# Patient Record
Sex: MALE | Race: WHITE | NOT HISPANIC OR LATINO | Employment: OTHER | URBAN - METROPOLITAN AREA
[De-identification: names, ages, dates, MRNs, and addresses within clinical notes are randomized per-mention and may not be internally consistent; named-entity substitution may affect disease eponyms.]

---

## 2017-01-12 ENCOUNTER — TELEPHONE (OUTPATIENT)
Dept: HEMATOLOGY/ONCOLOGY | Facility: CLINIC | Age: 77
End: 2017-01-12

## 2017-01-12 NOTE — TELEPHONE ENCOUNTER
----- Message from Kole Clark sent at 1/12/2017  3:13 PM CST -----  Contact: Chelly with [facility]   Chelly with [facility] is calling to get orders signed to admit pt for hospice.  Contact number [phone]

## 2017-01-18 ENCOUNTER — TELEPHONE (OUTPATIENT)
Dept: HEMATOLOGY/ONCOLOGY | Facility: CLINIC | Age: 77
End: 2017-01-18

## 2017-01-18 NOTE — TELEPHONE ENCOUNTER
----- Message from Lesly Longoria sent at 1/18/2017  9:45 AM CST -----  Contact: Pt's wife Esperanza   Pt's wife Esperanza is returning a call she missed from [place].     Contact number is [phone]

## 2017-01-18 NOTE — TELEPHONE ENCOUNTER
----- Message from Kole Clark sent at 1/18/2017  9:01 AM CST -----  Contact: pt wife   Pt wife states pt has multiple cancer, pt is on different medications and seem not be working, wife states pt is splitting  up blood for the past few days, wife has some concerns, pt is currently under hospice care at home.  Contact number [phone]

## 2017-01-18 NOTE — TELEPHONE ENCOUNTER
Returned call, wife states she is not satisfied with the care she has been receiving with hospice, has been throwing up with the slightest amt of liquid, meds not helping.  Emesis has blood in it, patient's wife wants to speak with MD.  Pain is not controlled  Patient states her  wants to come in to be admitted and have comfort control ,   Wife feels he should be more comfortable than he is.  Is wearing a Duragesic patch and taking liquid morphine, medications for nausea and anxiety not helping.  Nurse explained she would have Dr Ko call her and maybe he can f/u with Hospice to see if there are any other medications they could prescribe.  Patient wants to come to hospital, wife thinks he is to weak to come.  Is very anxious.  Patient with [facility] ,#[phone]. Mehnaz is the supervisor.  Hospice Nurse, Kassandra was present at time of the call and I spoke with her. She states his emesis has what looks like old blood in it.  She also stated patient is receiving phenergan and zofran for nausea.  Nurse stated she felt he may need a pain pump and will talk with the Director to see what they can do.

## 2017-01-19 ENCOUNTER — TELEPHONE (OUTPATIENT)
Dept: HEMATOLOGY/ONCOLOGY | Facility: CLINIC | Age: 77
End: 2017-01-19

## 2017-01-19 NOTE — TELEPHONE ENCOUNTER
----- Message from Kole Clark sent at 1/19/2017  1:58 PM CST -----  Contact: adwoa with [facility]   adwoa with [facility] is calling to inform  passed away today, pt was put on a infusion pump for pain on yesterday.  Contact number [phone]

## 2017-02-16 ENCOUNTER — TELEPHONE (OUTPATIENT)
Dept: HEMATOLOGY/ONCOLOGY | Facility: CLINIC | Age: 77
End: 2017-02-16

## 2017-02-17 NOTE — TELEPHONE ENCOUNTER
----- Message from Shanda Leung sent at 2/14/2017  3:44 PM CST -----  Contact: Bianca Darby is calling to confirm receipt of a fax.    Contact number [phone]

## 2017-04-13 ENCOUNTER — TELEPHONE (OUTPATIENT)
Dept: HEMATOLOGY/ONCOLOGY | Facility: CLINIC | Age: 77
End: 2017-04-13

## 2017-04-13 NOTE — TELEPHONE ENCOUNTER
Returned call to Mehnaz with hospice.   Mehnaz stated they refaxed order form.   I informed Mehnaz I would have Dr. Ko sign and fax back.   Mehnaz verbalized understanding.       ----- Message from Kole Clark sent at 4/13/2017  1:36 PM CDT -----  Contact: Mehnaz with [facility]   Mehnaz with [facility] states orders for pt admission CTI was sent over for  to signed and fax back over but orders were never received, facility states pa has passed but would still need for billing purposes.   [facility] number [phone]  Fax number [phone]

## 2018-03-21 ENCOUNTER — TELEPHONE (OUTPATIENT)
Dept: HEMATOLOGY/ONCOLOGY | Facility: CLINIC | Age: 78
End: 2018-03-21

## 2018-03-21 NOTE — TELEPHONE ENCOUNTER
----- Message from Alana Holt NP sent at 3/21/2018  2:39 PM CDT -----  Contact: Pt wife      ----- Message -----  From: Siobhan Hoskins  Sent: 3/21/2018   1:17 PM  To: Alana Holt NP    Pt wife calling asking to speak with you regarding a referral to a doctor.        Esperanza call back number [phone]

## 2018-03-21 NOTE — TELEPHONE ENCOUNTER
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 Her home PCP says that it is nothing, she spoke to a cancer center in [place] and they feel she needs to have further evaluation. She is asking for a referral to a MD here at [facility] that may be able to do a further work up. Her most recent Calcium is 10.5 and her "other level is 136."  "
